# Patient Record
Sex: MALE | Race: WHITE | NOT HISPANIC OR LATINO | URBAN - METROPOLITAN AREA
[De-identification: names, ages, dates, MRNs, and addresses within clinical notes are randomized per-mention and may not be internally consistent; named-entity substitution may affect disease eponyms.]

---

## 2019-02-10 ENCOUNTER — EMERGENCY (EMERGENCY)
Facility: HOSPITAL | Age: 74
LOS: 1 days | Discharge: HOME | End: 2019-02-10
Attending: EMERGENCY MEDICINE | Admitting: EMERGENCY MEDICINE
Payer: MEDICARE

## 2019-02-10 VITALS
SYSTOLIC BLOOD PRESSURE: 117 MMHG | OXYGEN SATURATION: 96 % | DIASTOLIC BLOOD PRESSURE: 64 MMHG | RESPIRATION RATE: 18 BRPM | TEMPERATURE: 98 F | HEART RATE: 65 BPM

## 2019-02-10 VITALS
HEART RATE: 83 BPM | RESPIRATION RATE: 18 BRPM | WEIGHT: 156.09 LBS | OXYGEN SATURATION: 96 % | SYSTOLIC BLOOD PRESSURE: 134 MMHG | DIASTOLIC BLOOD PRESSURE: 81 MMHG

## 2019-02-10 DIAGNOSIS — I10 ESSENTIAL (PRIMARY) HYPERTENSION: ICD-10-CM

## 2019-02-10 DIAGNOSIS — K13.79 OTHER LESIONS OF ORAL MUCOSA: ICD-10-CM

## 2019-02-10 DIAGNOSIS — Z79.02 LONG TERM (CURRENT) USE OF ANTITHROMBOTICS/ANTIPLATELETS: ICD-10-CM

## 2019-02-10 DIAGNOSIS — Z79.01 LONG TERM (CURRENT) USE OF ANTICOAGULANTS: ICD-10-CM

## 2019-02-10 LAB
ALBUMIN SERPL ELPH-MCNC: 4.4 G/DL — SIGNIFICANT CHANGE UP (ref 3.3–5)
ALP SERPL-CCNC: 75 U/L — SIGNIFICANT CHANGE UP (ref 40–120)
ALT FLD-CCNC: 13 U/L — SIGNIFICANT CHANGE UP (ref 10–45)
ANION GAP SERPL CALC-SCNC: 10 MMOL/L — SIGNIFICANT CHANGE UP (ref 5–17)
APTT BLD: 31.2 SEC — SIGNIFICANT CHANGE UP (ref 27.5–36.3)
AST SERPL-CCNC: 14 U/L — SIGNIFICANT CHANGE UP (ref 10–40)
BASOPHILS # BLD AUTO: 0.02 K/UL — SIGNIFICANT CHANGE UP (ref 0–0.2)
BASOPHILS NFR BLD AUTO: 0.4 % — SIGNIFICANT CHANGE UP (ref 0–2)
BILIRUB SERPL-MCNC: 0.6 MG/DL — SIGNIFICANT CHANGE UP (ref 0.2–1.2)
BUN SERPL-MCNC: 17 MG/DL — SIGNIFICANT CHANGE UP (ref 7–23)
CALCIUM SERPL-MCNC: 9.5 MG/DL — SIGNIFICANT CHANGE UP (ref 8.4–10.5)
CHLORIDE SERPL-SCNC: 104 MMOL/L — SIGNIFICANT CHANGE UP (ref 96–108)
CO2 SERPL-SCNC: 26 MMOL/L — SIGNIFICANT CHANGE UP (ref 22–31)
CREAT SERPL-MCNC: 0.99 MG/DL — SIGNIFICANT CHANGE UP (ref 0.5–1.3)
EOSINOPHIL # BLD AUTO: 0.07 K/UL — SIGNIFICANT CHANGE UP (ref 0–0.5)
EOSINOPHIL NFR BLD AUTO: 1.4 % — SIGNIFICANT CHANGE UP (ref 0–6)
GLUCOSE SERPL-MCNC: 123 MG/DL — HIGH (ref 70–99)
HCT VFR BLD CALC: 37 % — LOW (ref 39–50)
HGB BLD-MCNC: 12 G/DL — LOW (ref 13–17)
IMM GRANULOCYTES NFR BLD AUTO: 0.4 % — SIGNIFICANT CHANGE UP (ref 0–1.5)
INR BLD: 1.08 — SIGNIFICANT CHANGE UP (ref 0.88–1.16)
LYMPHOCYTES # BLD AUTO: 1.03 K/UL — SIGNIFICANT CHANGE UP (ref 1–3.3)
LYMPHOCYTES # BLD AUTO: 19.9 % — SIGNIFICANT CHANGE UP (ref 13–44)
MCHC RBC-ENTMCNC: 28.2 PG — SIGNIFICANT CHANGE UP (ref 27–34)
MCHC RBC-ENTMCNC: 32.4 GM/DL — SIGNIFICANT CHANGE UP (ref 32–36)
MCV RBC AUTO: 87.1 FL — SIGNIFICANT CHANGE UP (ref 80–100)
MONOCYTES # BLD AUTO: 0.41 K/UL — SIGNIFICANT CHANGE UP (ref 0–0.9)
MONOCYTES NFR BLD AUTO: 7.9 % — SIGNIFICANT CHANGE UP (ref 2–14)
NEUTROPHILS # BLD AUTO: 3.63 K/UL — SIGNIFICANT CHANGE UP (ref 1.8–7.4)
NEUTROPHILS NFR BLD AUTO: 70 % — SIGNIFICANT CHANGE UP (ref 43–77)
NRBC # BLD: 0 /100 WBCS — SIGNIFICANT CHANGE UP (ref 0–0)
PLATELET # BLD AUTO: 163 K/UL — SIGNIFICANT CHANGE UP (ref 150–400)
POTASSIUM SERPL-MCNC: 4.1 MMOL/L — SIGNIFICANT CHANGE UP (ref 3.5–5.3)
POTASSIUM SERPL-SCNC: 4.1 MMOL/L — SIGNIFICANT CHANGE UP (ref 3.5–5.3)
PROT SERPL-MCNC: 7.7 G/DL — SIGNIFICANT CHANGE UP (ref 6–8.3)
PROTHROM AB SERPL-ACNC: 12.2 SEC — SIGNIFICANT CHANGE UP (ref 10–12.9)
RBC # BLD: 4.25 M/UL — SIGNIFICANT CHANGE UP (ref 4.2–5.8)
RBC # FLD: 13.6 % — SIGNIFICANT CHANGE UP (ref 10.3–14.5)
SODIUM SERPL-SCNC: 140 MMOL/L — SIGNIFICANT CHANGE UP (ref 135–145)
WBC # BLD: 5.18 K/UL — SIGNIFICANT CHANGE UP (ref 3.8–10.5)
WBC # FLD AUTO: 5.18 K/UL — SIGNIFICANT CHANGE UP (ref 3.8–10.5)

## 2019-02-10 PROCEDURE — 85610 PROTHROMBIN TIME: CPT

## 2019-02-10 PROCEDURE — 80053 COMPREHEN METABOLIC PANEL: CPT

## 2019-02-10 PROCEDURE — 85025 COMPLETE CBC W/AUTO DIFF WBC: CPT

## 2019-02-10 PROCEDURE — 85730 THROMBOPLASTIN TIME PARTIAL: CPT

## 2019-02-10 PROCEDURE — 99284 EMERGENCY DEPT VISIT MOD MDM: CPT

## 2019-02-10 PROCEDURE — 36415 COLL VENOUS BLD VENIPUNCTURE: CPT

## 2019-02-10 PROCEDURE — 99283 EMERGENCY DEPT VISIT LOW MDM: CPT

## 2019-02-10 RX ORDER — CLOPIDOGREL BISULFATE 75 MG/1
0 TABLET, FILM COATED ORAL
Qty: 0 | Refills: 0 | COMMUNITY

## 2019-02-10 RX ORDER — ASPIRIN/CALCIUM CARB/MAGNESIUM 324 MG
1 TABLET ORAL
Qty: 0 | Refills: 0 | COMMUNITY

## 2019-02-10 NOTE — ED PROVIDER NOTE - MEDICAL DECISION MAKING DETAILS
bleeding from dental implant.  hemostasis achieved with surgicel and gauze soaked in lido/epi with pressure for 1 hr.  instructions how to treat at home discussed and return precautions

## 2019-02-10 NOTE — ED PROVIDER NOTE - NSFOLLOWUPINSTRUCTIONS_ED_ALL_ED_FT
Please see your primary care provider in 24-48 hours.  Return to the ER if symptoms worsen or other concerns. Please see your primary care provider in 24-48 hours.  Return to the ER if symptoms worsen or other concerns.    IF BLEEDING RECURS, WET GAUZE, TUCK UP UNDER BACK OF TOOTH AND BITE DOWN FOR 1 HOUR.  IF IT WONT STOP, RETURN TO THE ER.

## 2019-02-10 NOTE — ED PROVIDER NOTE - OBJECTIVE STATEMENT
here with bleeding from site of dental implant done on Monday.  Says it bled initially then stopped.  Recurred requiring visit to ed then stopped.  This am recurred again and seems more pronounced.  Wont stop despite biting on gauze.  Notes he started asa/ plavix again 2 days ago.  No trauma or fever.

## 2019-02-10 NOTE — ED ADULT NURSE NOTE - CHPI ED NUR SYMPTOMS NEG
no fever/no vomiting/no nausea/no numbness/no syncope/no weakness/no chills/no loss of consciousness

## 2019-02-10 NOTE — ED ADULT NURSE NOTE - OBJECTIVE STATEMENT
74 y/o male c/o increase bleeding beginning this morning. AOx3, hx 2 stents placed in 2013 and on aspirin 81mg and plavix. Had two dental implants on 2/4, per patient "stopped taking blood thinners 5 days before and resumed medications on 2/7". Woke up this morning and noticed bleeding, soaked multiple towels and tissues. Presents to ED with bleeding that has not subsided, gauze applied and replaced. Denies difficulty swallowing, drooling, headache, SoB, fevers, CP, abdominal pain, recent falls, calf pain. Plan of care explained, awaiting provider consult. 72 y/o male c/o increase bleeding beginning this morning. AOx3, hx 2 stents placed in 2013 and on aspirin 81mg and plavix. Had two dental implants on 2/4, per patient "stopped taking blood thinners 5 days before and resumed medications on 2/7". Woke up this morning and noticed bleeding, soaked multiple towels and tissues. Presents to ED with bleeding that has not subsided on right upper mouth, gauze applied and replaced. Denies difficulty swallowing, drooling, headache, SoB, fevers, CP, abdominal pain, recent falls, calf pain. Plan of care explained, suction set up bedside, awaiting provider consult.

## 2019-02-10 NOTE — ED ADULT NURSE NOTE - NSIMPLEMENTINTERV_GEN_ALL_ED
Implemented All Universal Safety Interventions:  [place] to call system. Call bell, personal items and telephone within reach. Instruct patient to call for assistance. Room bathroom lighting operational. Non-slip footwear when patient is off stretcher. Physically safe environment: no spills, clutter or unnecessary equipment. Stretcher in lowest position, wheels locked, appropriate side rails in place.

## 2019-02-14 ENCOUNTER — EMERGENCY (EMERGENCY)
Facility: HOSPITAL | Age: 74
LOS: 1 days | Discharge: HOME | End: 2019-02-14
Attending: EMERGENCY MEDICINE | Admitting: EMERGENCY MEDICINE
Payer: MEDICARE

## 2019-02-14 VITALS
RESPIRATION RATE: 18 BRPM | DIASTOLIC BLOOD PRESSURE: 69 MMHG | TEMPERATURE: 98 F | OXYGEN SATURATION: 97 % | HEART RATE: 73 BPM | SYSTOLIC BLOOD PRESSURE: 130 MMHG

## 2019-02-14 VITALS
TEMPERATURE: 98 F | SYSTOLIC BLOOD PRESSURE: 125 MMHG | OXYGEN SATURATION: 98 % | HEART RATE: 65 BPM | RESPIRATION RATE: 18 BRPM | DIASTOLIC BLOOD PRESSURE: 86 MMHG

## 2019-02-14 DIAGNOSIS — I10 ESSENTIAL (PRIMARY) HYPERTENSION: ICD-10-CM

## 2019-02-14 DIAGNOSIS — Z79.82 LONG TERM (CURRENT) USE OF ASPIRIN: ICD-10-CM

## 2019-02-14 DIAGNOSIS — K08.89 OTHER SPECIFIED DISORDERS OF TEETH AND SUPPORTING STRUCTURES: ICD-10-CM

## 2019-02-14 DIAGNOSIS — K06.8 OTHER SPECIFIED DISORDERS OF GINGIVA AND EDENTULOUS ALVEOLAR RIDGE: ICD-10-CM

## 2019-02-14 PROCEDURE — 99283 EMERGENCY DEPT VISIT LOW MDM: CPT

## 2019-02-14 PROCEDURE — 99283 EMERGENCY DEPT VISIT LOW MDM: CPT | Mod: 25

## 2019-02-14 RX ORDER — TRANEXAMIC ACID 100 MG/ML
1000 INJECTION, SOLUTION INTRAVENOUS ONCE
Qty: 0 | Refills: 0 | Status: DISCONTINUED | OUTPATIENT
Start: 2019-02-14 | End: 2019-02-14

## 2019-02-14 NOTE — ED PROVIDER NOTE - OBJECTIVE STATEMENT
74yo M here w/ recurrent bleeding from posterior molar on R upper side after extraction. .seen in ED for this, improved, but started bleeding heavily again today. was seen by his dentist this am without issue, no bleeding at that time. supposed to be on ASA but holding for now.

## 2019-02-14 NOTE — ED ADULT TRIAGE NOTE - ARRIVAL INFO ADDITIONAL COMMENTS
pt had a dental implant on 2/4 and then had several episodes of bleeding which eventually required an ER visit where it was packed.  pt had restarted plavix and ASA just prior to the bleeding starting.

## 2019-02-14 NOTE — ED PROVIDER NOTE - NSFOLLOWUPINSTRUCTIONS_ED_ALL_ED_FT
See your dentist tomorrow.     Bleeding After Dental Procedures  A certain amount of bleeding is common after some dental procedures. You have a higher risk of bleeding after a dental procedure if you are taking medicine to prevent your blood from clotting (blood thinner medicine).    Before having any dental procedures, including routine cleanings, tell your dental care provider about:    All of your health problems.  All medicines and supplements that you are taking.    Your dental care provider should talk with your health care provider before stopping any blood thinner medicine that you are taking. Stopping your medicine could lead to serious health problems such as a heart attack or stroke.    What dental procedures raise my risk of bleeding?  Some dental procedures that may cause bleeding include:    Teeth cleaning (prophylaxis).  Deep teeth cleaning (scaling and root planing).  Gum (periodontal) surgery.  Tooth removal (extraction).  Dental implant placement.  Removal of tissue for testing (biopsy).    Dental procedures do not always cause bleeding in every person. Blood mixes with saliva, so there may seem to be more bleeding than there really is.    How is this treated?  If your bleeding is severe, your dental care provider may:    Place a surgical sponge or bandage (dressing) over the area that is bleeding.  Clean the area and close your wound with stitches (sutures).  Use an electrical device (cautery) or a laser to stop bleeding.  Prescribe or apply a medicine to help control bleeding.    Follow these instructions at home:  For 24 hours after your procedure or longer:    Do not spit or rinse your mouth.  Do not drink hot beverages.  Do not drink with a straw.  Do not use any products that contain nicotine or tobacco, such as cigarettes and e-cigarettes. If you need help quitting, ask your healthcare provider.    Do not eat hard foods or foods with sharp edges during the first 2–3 days after your procedure. These foods include nuts, pretzels, and chips.  Bite down firmly on moist gauze over the area for at least 30 minutes after the procedure. If bleeding does not stop after that time, continue biting firmly on moist gauze or a tea bag for 30 more minutes, or as long as your health care provider recommends.  Take over the counter and prescription medicines only as told by your health care provider.  Keep all follow-up visits as told by your health care provider. This is important.    Contact a health care provider if:  Your bleeding gets worse, is severe, or does not stop.  You have a large blood clot where the bleeding stopped.  You have a fever.  Get help right away if:  You have chest pain.  You have trouble breathing.  You feel light-headed, dizzy, or confused.  You have trouble speaking.  These symptoms may be an emergency. Do not wait to see if the symptoms will go away. Get medical help right away. Call your local emergency services (911 in the [place] Do not drive yourself to the hospital.     Summary  A certain amount of bleeding is common after some dental procedures.  You may be able to stop the bleeding on your own. If you cannot stop the bleeding, contact your dental care provider or your health care provider.  You have a higher risk of bleeding after a dental procedure if you are taking medicine to prevent your blood from clotting (blood thinner medicine).  Talk with your health care provider before stopping any blood thinner medicine that you are taking. Stopping your medicine could lead to serious health problems such as a heart attack or stroke.

## 2019-02-14 NOTE — ED ADULT NURSE NOTE - OBJECTIVE STATEMENT
PT came to ED complaining of bleeding to right upper teeth. Pt reports recent 5 tooth implants, reports being seen at [facility] ED several days ago for bleeding, DC'd, Bleeding started again today.  PT denies any pain, chest pain, SOB, Dizziness/nausea or vomiting. PT denies all other medical complaints at this time. PT spit out large clot, MD Potter and Reuben at bedside. Gauze placed in mouth for bleeding control.  Alert and oriented x3, ambulatory with even gait.

## 2019-02-14 NOTE — ED PROVIDER NOTE - PHYSICAL EXAMINATION
CONSTITUTIONAL: Well-appearing; well-nourished; in no apparent distress.   HEAD: Normocephalic; atraumatic.   EYES:  conjunctiva and sclera clear  ENT: normal nose; no rhinorrhea; normal pharynx with no erythema or lesions. R upper mouth w/ active bleeding, not pulsatile, unable to visualize source directly.    NECK: Supple; non-tender;   RESPIRATORY: Breathing easily; no resp distress  EXT: No cyanosis or edema; N/V intact  SKIN: Normal for age and race; warm; dry; good turgor; no apparent lesions or rash.   NEURO: A & O x 3; face symmetric; grossly unremarkable.   PSYCHOLOGICAL: The patient’s mood and manner are appropriate.

## 2019-02-14 NOTE — ED PROVIDER NOTE - CLINICAL SUMMARY MEDICAL DECISION MAKING FREE TEXT BOX
here w/ heavy bleeding from area that was extracted, recurrent. bleeding slowed w/ guaze soaked w/ lido w/epi but did not stop. bleeding too heavy for surgicel to help. TXA topically applied to area on gauze finally stopped bleeding. pt observed in ED for an hour after with no recurrent bleeding. DC home in NAD with strict return precautions given, small amount of txa left over given to pt in case rebleeds w/ instructions to apply to gauze and pack again while enroute to return to the ED

## 2019-02-15 PROBLEM — I10 ESSENTIAL (PRIMARY) HYPERTENSION: Chronic | Status: ACTIVE | Noted: 2019-02-10

## 2022-11-07 NOTE — ED ADULT TRIAGE NOTE - CHIEF COMPLAINT QUOTE
Patient is s/p dental implant on wednesday, complaining of non-stop bleeding since this morning". Chest wall-abrasions to right chest healing, small puncture wound with granulation tissue Patient is s/p dental implant on Monday, complaining of non-stop bleeding since this morning".